# Patient Record
Sex: MALE | HISPANIC OR LATINO | Employment: UNEMPLOYED | URBAN - METROPOLITAN AREA
[De-identification: names, ages, dates, MRNs, and addresses within clinical notes are randomized per-mention and may not be internally consistent; named-entity substitution may affect disease eponyms.]

---

## 2017-05-03 ENCOUNTER — OFFICE VISIT (OUTPATIENT)
Dept: URGENT CARE | Facility: CLINIC | Age: 22
End: 2017-05-03
Payer: COMMERCIAL

## 2017-05-03 VITALS
RESPIRATION RATE: 20 BRPM | HEART RATE: 72 BPM | OXYGEN SATURATION: 96 % | DIASTOLIC BLOOD PRESSURE: 80 MMHG | TEMPERATURE: 99.9 F | WEIGHT: 179 LBS | HEIGHT: 67 IN | BODY MASS INDEX: 28.09 KG/M2 | SYSTOLIC BLOOD PRESSURE: 120 MMHG

## 2017-05-03 DIAGNOSIS — J06.9 VIRAL UPPER RESPIRATORY ILLNESS: ICD-10-CM

## 2017-05-03 LAB
INT CON NEG: NORMAL
INT CON POS: NORMAL
S PYO AG THROAT QL: NEGATIVE

## 2017-05-03 PROCEDURE — 87880 STREP A ASSAY W/OPTIC: CPT | Performed by: FAMILY MEDICINE

## 2017-05-03 PROCEDURE — 99203 OFFICE O/P NEW LOW 30 MIN: CPT | Performed by: FAMILY MEDICINE

## 2017-05-03 ASSESSMENT — ENCOUNTER SYMPTOMS
SWOLLEN GLANDS: 1
TROUBLE SWALLOWING: 1
COUGH: 1
DIARRHEA: 0
ABDOMINAL PAIN: 0
NAUSEA: 0
HOARSE VOICE: 1
CHILLS: 1
VOMITING: 0
FEVER: 1
DIZZINESS: 1
HEADACHES: 1
SHORTNESS OF BREATH: 0

## 2017-05-03 NOTE — Clinical Note
May 3, 2017         Patient: Harley Taveras   YOB: 1995   Date of Visit: 5/3/2017           To Whom it May Concern:    Harley Taveras was seen in my clinic on 5/3/2017. He may return to school on Saturday, 5/6/17.    If you have any questions or concerns, please don't hesitate to call.        Sincerely,           Andrea Moore M.D.  Electronically Signed

## 2017-05-03 NOTE — MR AVS SNAPSHOT
"        Harley Police   5/3/2017 5:00 PM   Office Visit   MRN: [id number]    Department:  [facility]   Dept Phone:  [phone]    Description:  Male : 1995   Provider:  Andrea Moore M.D.           Reason for Visit     Pharyngitis Couple days sorethroat, neck pain and headache      Allergies as of 5/3/2017     No Known Allergies      You were diagnosed with     Viral upper respiratory illness   [[id number]]         Vital Signs     Blood Pressure Pulse Temperature Respirations Height Weight    120/80 mmHg 72 37.7 °C (99.9 °F) 20 1.702 m (5' 7\") 81.194 kg (179 lb)    Body Mass Index Oxygen Saturation Smoking Status             28.03 kg/m2 96% Never Smoker          Basic Information     Date Of Birth Sex Race Ethnicity Preferred Language    1995 Male Unable to Obtain  Origin (Slovak,Norwegian,Fijian,Sarmad, etc) English      Health Maintenance     Patient has no pending health maintenance at this time      Current Immunizations     No immunizations on file.      Below and/or attached are the medications your provider expects you to take. Review all of your home medications and newly ordered medications with your provider and/or pharmacist. Follow medication instructions as directed by your provider and/or pharmacist. Please keep your medication list with you and share with your provider. Update the information when medications are discontinued, doses are changed, or new medications (including over-the-counter products) are added; and carry medication information at all times in the event of emergency situations     Allergies:  No Known Allergies          Medications  Valid as of: May 03, 2017 -  5:18 PM    Generic Name Brand Name Tablet Size Instructions for use    mag hydrox-al hydrox-simeth-diphenhydrAMINE-lidocaine viscous 2%   Take 30 mL by mouth 4 times a day as needed for up to 4 days.        .                 Medicines prescribed today were sent to:     [id number] - " [place], [place] - [address] AT [address] [facility] [address] & [place]    [address] [place] [address]    Phone: [phone] Fax: [phone]    Open 24 Hours?: Yes      Medication refill instructions:       If your prescription bottle indicates you have medication refills left, it is not necessary to call your provider’s office. Please contact your pharmacy and they will refill your medication.    If your prescription bottle indicates you do not have any refills left, you may request refills at any time through one of the following ways: The online LEAPIN Digital Keys system (except Urgent Care), by calling your provider’s office, or by asking your pharmacy to contact your provider’s office with a refill request. Medication refills are processed only during regular business hours and may not be available until the next business day. Your provider may request additional information or to have a follow-up visit with you prior to refilling your medication.   *Please Note: Medication refills are assigned a new Rx number when refilled electronically. Your pharmacy may indicate that no refills were authorized even though a new prescription for the same medication is available at the pharmacy. Please request the medicine by name with the pharmacy before contacting your provider for a refill.           LEAP[employer] Digital Keys Access Code: [email or web address]  Expires: 6/2/2017  5:18 PM    Your email address is not on file at [facility].  Email Addresses are required for you to sign up for LEAP[employer] Digital Keys, please contact [phone] to verify your personal information and to provide your email address prior to attempting to register for LEAP[employer] Digital Keys.    Harley Police  [address] [place] [address]    LEAPIN Digital Keys  A secure, online tool to manage your health information     [facility]’s LEAPIN Digital Keys® is a secure, online tool that connects you to your personalized health information from the privacy of your home -- day or night - making it very easy for you to  manage your healthcare. Once the activation process is completed, you can even access your medical information using the Amimon mallorie, which is available for free in the Apple Mallorie store or Google Play store.     To learn more about Amimon, visit [email or web address]    There are two levels of access available (as shown below):   My Chart Features  [facility]  Urgent  Care [facility]   Email your healthcare team securely and privately 24/7 X X X    Manage appointments: schedule your next appointment; view details of past/upcoming appointments X      Request prescription refills. X      View recent personal medical records, including lab and immunizations X X X X   View health record, including health history, allergies, medications X X X X   Read reports about your outpatient visits, procedures, consult and ER notes X X X X   See your discharge summary, which is a recap of your hospital and/or ER visit that includes your diagnosis, lab results, and care plan X X  X     How to register for Amimon:  Once your e-mail address has been verified, follow the following steps to sign up for Amimon.     1. Go to  [email or web address]  2. Click on the Sign Up Now box, which takes you to the New Member Sign Up page. You will need to provide the following information:  a. Enter your Amimon Access Code exactly as it appears at the top of this page. (You will not need to use this code after you’ve completed the sign-up process. If you do not sign up before the expiration date, you must request a new code.)   b. Enter your date of birth.   c. Enter your home email address.   d. Click Submit, and follow the next screen’s instructions.  3. Create a Amimon ID. This will be your Amimon login ID and cannot be changed, so think of one that is secure and easy to remember.  4. Create a Amimon password. You can change your password at any time.  5. Enter your  Password Reset Question and Answer. This can be used at a later time if you forget your password.   6. Enter your e-mail address. This allows you to receive e-mail notifications when new information is available in [employer].  7. Click Sign Up. You can now view your health information.    For assistance activating your [employer] account, call [phone]

## 2017-05-04 NOTE — PROGRESS NOTES
"Subjective:      Harley Taveras is a 21 y.o. male who presents with Pharyngitis            Pharyngitis   This is a new problem. The current episode started in the past 7 days (2-3 days). The problem has been unchanged. The pain is worse on the right side. The pain is at a severity of 5/10. The pain is moderate. Associated symptoms include congestion, coughing, ear pain, headaches, a hoarse voice, a plugged ear sensation, swollen glands and trouble swallowing. Pertinent negatives include no abdominal pain, diarrhea, ear discharge, shortness of breath or vomiting. He has had exposure to strep. He has had no exposure to mono. He has tried NSAIDs and acetaminophen (nyquil) for the symptoms. The treatment provided moderate relief.       Review of Systems   Constitutional: Positive for fever and chills.   HENT: Positive for congestion, ear pain, hoarse voice and trouble swallowing. Negative for ear discharge.    Respiratory: Positive for cough. Negative for shortness of breath.    Cardiovascular: Negative for chest pain.   Gastrointestinal: Negative for nausea, vomiting, abdominal pain and diarrhea.   Genitourinary: Negative for dysuria and urgency.   Neurological: Positive for dizziness and headaches.     PMH:  has a past medical history of Brain concussion.  MEDS: No current outpatient prescriptions on file.  ALLERGIES: No Known Allergies  SURGHX:   Past Surgical History   Procedure Laterality Date   • Dental extraction(s)       SOCHX:  reports that he has never smoked. He does not have any smokeless tobacco history on file. He reports that he drinks alcohol. He reports that he uses illicit drugs (Marijuana).  FH: Family history was reviewed, no pertinent findings to report       Objective:     /80 mmHg  Pulse 72  Temp(Src) 37.7 °C (99.9 °F)  Resp 20  Ht 1.702 m (5' 7\")  Wt 81.194 kg (179 lb)  BMI 28.03 kg/m2  SpO2 96%     Physical Exam   Constitutional: He appears well-developed.   HENT:   Head: " Normocephalic.   Right Ear: External ear normal.   Left Ear: External ear normal.   Mouth/Throat: Oropharyngeal exudate present.   Nasal congestion    Eyes: Right eye exhibits no discharge. Left eye exhibits no discharge.   Neck: Normal range of motion. No tracheal deviation present. No thyromegaly present.   Right cervical lymph node   Cardiovascular: Normal rate.  Exam reveals no friction rub.    No murmur heard.  Pulmonary/Chest: Effort normal. No stridor. No respiratory distress. He has no wheezes.   Abdominal: Soft. He exhibits no distension. There is no tenderness. There is no guarding.   Lymphadenopathy:     He has cervical adenopathy.   Neurological: He is alert.   Skin: Skin is warm and dry. He is not diaphoretic.   Psychiatric: He has a normal mood and affect. His behavior is normal.               Assessment/Plan:     1. Viral upper respiratory illness  POCT Rapid Strep A    mag hydrox-al hydrox-simeth-diphenhydrAMINE-lidocaine viscous 2%     Supportive care  Push fluids  Monitor temperature  Follow-up if symptoms worsen or fail to improve

## 2017-12-15 ENCOUNTER — OFFICE VISIT (OUTPATIENT)
Dept: MEDICAL GROUP | Facility: MEDICAL CENTER | Age: 22
End: 2017-12-15
Payer: COMMERCIAL

## 2017-12-15 VITALS
OXYGEN SATURATION: 98 % | HEIGHT: 67 IN | RESPIRATION RATE: 16 BRPM | SYSTOLIC BLOOD PRESSURE: 116 MMHG | WEIGHT: 175.8 LBS | HEART RATE: 67 BPM | DIASTOLIC BLOOD PRESSURE: 74 MMHG | BODY MASS INDEX: 27.59 KG/M2 | TEMPERATURE: 97.3 F

## 2017-12-15 DIAGNOSIS — F41.9 ANXIETY AND DEPRESSION: ICD-10-CM

## 2017-12-15 DIAGNOSIS — F32.A ANXIETY AND DEPRESSION: ICD-10-CM

## 2017-12-15 DIAGNOSIS — Z76.89 ENCOUNTER TO ESTABLISH CARE: ICD-10-CM

## 2017-12-15 PROCEDURE — 99204 OFFICE O/P NEW MOD 45 MIN: CPT | Performed by: PHYSICIAN ASSISTANT

## 2017-12-15 RX ORDER — CITALOPRAM 20 MG/1
20 TABLET ORAL DAILY
Qty: 30 TAB | Refills: 3 | Status: SHIPPED | OUTPATIENT
Start: 2017-12-15 | End: 2018-02-05 | Stop reason: SDUPTHER

## 2017-12-15 ASSESSMENT — PATIENT HEALTH QUESTIONNAIRE - PHQ9
SUM OF ALL RESPONSES TO PHQ QUESTIONS 1-9: 19
5. POOR APPETITE OR OVEREATING: 0 - NOT AT ALL
CLINICAL INTERPRETATION OF PHQ2 SCORE: 5

## 2017-12-15 NOTE — PROGRESS NOTES
"Subjective:   Harley Taveras is a 22 y.o. male here today for chronic history of anxiety and depression.    Anxiety and depression  This is a 15-year-old male complains of a chronic history of anxiety and depression. He's been feeling this way for about 7 years. He states finally he is accepting the fact that he needs help. His mother is up here from [place] his home town to help him. Complains of lack of self worth. Sleeping all the time. This past week he had his first panic attack. Thought he was going to die. Complains of anxiety where he doesn't want to leave his dwelling. His mother's been calling around and he does have an appointment on the fifth with behavioral health. This was actually unknown to us until we printed out the discharge summary. Denies any homicidal or suicidal ideations.       Current medicines (including changes today)  Current Outpatient Prescriptions   Medication Sig Dispense Refill   • citalopram (CELEXA) 20 MG Tab Take 1 Tab by mouth every day. 1/2 tablet daily x 5 days. 30 Tab 3     No current facility-administered medications for this visit.      He  has a past medical history of Brain concussion.    ROS   No chest pain, no shortness of breath, no abdominal pain and all other systems were reviewed and are negative.       Objective:     Blood pressure 116/74, pulse 67, temperature 36.3 °C (97.3 °F), resp. rate 16, height 1.702 m (5' 7\"), weight 79.7 kg (175 lb 12.8 oz), SpO2 98 %. Body mass index is 27.53 kg/m².   Physical Exam:  Constitutional: Alert, no distress.  Skin: Warm, dry, good turgor, no rashes in visible areas.  Eye: Equal, round and reactive, conjunctiva clear, lids normal.  ENMT: Lips without lesions, good dentition, oropharynx clear.  Neck: Trachea midline, no masses.   Lymph: No cervical or supraclavicular lymphadenopathy  Respiratory: Unlabored respiratory effort, lungs appear clear, no wheezes.  Cardiovascular: Regular rate and rhythm.   Psych: Alert and oriented x3, " normal affect and mood.        Assessment and Plan:   The following treatment plan was discussed    1. Anxiety and depression  Chronic condition. Discussed with appointment with behavioral health on 5 January. May contact behavioral health for an appointment sooner if possible. We'll start with Celexa 10 mg for 5 days and increase to 20 mg daily. Advised to become a member of my chart contact me with any concerns or questions. We talked about medication for panic attacks but at this point since he had his first one we will wait to see to have some more frequently.  - Patient has been identified as being depressed and appropriate orders and counseling have been given  - REFERRAL TO BEHAVIORAL HEALTH  - citalopram (CELEXA) 20 MG Tab; Take 1 Tab by mouth every day. 1/2 tablet daily x 5 days.  Dispense: 30 Tab; Refill: 3    2. Encounter to establish care    Followup: Return in about 4 weeks (around 1/12/2018), or if symptoms worsen or fail to improve.    Please note that this dictation was created using voice recognition software. I have made every reasonable attempt to correct obvious errors, but I expect that there are errors of grammar and possibly content that I did not discover before finalizing the note.

## 2017-12-15 NOTE — ASSESSMENT & PLAN NOTE
This is a 15-year-old male complains of a chronic history of anxiety and depression. He's been feeling this way for about 7 years. He states finally he is accepting the fact that he needs help. His mother is up here from [place] his home town to help him. Complains of lack of self worth. Sleeping all the time. This past week he had his first panic attack. Thought he was going to die. Complains of anxiety where he doesn't want to leave his dwelling. His mother's been calling around and he does have an appointment on the fifth with behavioral health. This was actually unknown to us until we printed out the discharge summary. Denies any homicidal or suicidal ideations.

## 2018-01-05 ENCOUNTER — OFFICE VISIT (OUTPATIENT)
Dept: BEHAVIORAL HEALTH | Facility: PHYSICIAN GROUP | Age: 23
End: 2018-01-05
Payer: COMMERCIAL

## 2018-01-05 DIAGNOSIS — F41.9 MODERATE ANXIETY: ICD-10-CM

## 2018-01-05 DIAGNOSIS — F33.0 MILD EPISODE OF RECURRENT MAJOR DEPRESSIVE DISORDER (HCC): ICD-10-CM

## 2018-01-05 PROCEDURE — 90791 PSYCH DIAGNOSTIC EVALUATION: CPT | Performed by: SOCIAL WORKER
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
## 2018-01-17 ENCOUNTER — OFFICE VISIT (OUTPATIENT)
Dept: BEHAVIORAL HEALTH | Facility: PHYSICIAN GROUP | Age: 23
End: 2018-01-17
Payer: COMMERCIAL

## 2018-01-17 DIAGNOSIS — F43.22 ADJUSTMENT DISORDER WITH ANXIETY: ICD-10-CM

## 2018-01-17 DIAGNOSIS — F34.1 DYSTHYMIC DISORDER: ICD-10-CM

## 2018-01-17 DIAGNOSIS — F41.0 PANIC DISORDER: ICD-10-CM

## 2018-01-17 PROBLEM — F32.A ANXIETY AND DEPRESSION: Status: RESOLVED | Noted: 2017-12-15 | Resolved: 2018-01-17

## 2018-01-17 PROBLEM — F41.9 ANXIETY AND DEPRESSION: Status: RESOLVED | Noted: 2017-12-15 | Resolved: 2018-01-17

## 2018-01-17 PROCEDURE — 90834 PSYTX W PT 45 MINUTES: CPT | Performed by: PSYCHOLOGIST

## 2018-01-17 NOTE — BH THERAPY
[facility]  Therapy Progress Note    Patient Name: Harley Police  Patient MRN: [id number]  Today's Date: 1/17/2018     Type of session:Individual psychotherapy  Length of session: 45 minutes  Persons in attendance:Patient    Subjective/New Info: Pt transferring from [facility] due to insurance. Pt presents with struggles in intense anxiety and panic attacks. Pt has struggled with anxiety and depression off and on, but it started getting worse when moved from [facility] to start [employer] last year. Last semester, Pt could hardly get himself to leave home or attend classes and did poorly in school. Pt started celexa 4 weeks ago with some improvement. Disc tx goals. Psychoeducation and practice on ACT concepts: letting go of unproductive thought fusion, defining & acting on values, befriending anxiety and depression, mindfulness acceptance, and cultivating psychological flexibility. Gave some relaxation strategies for Pt to use. Family hx + for alcoholism, bipolar, anger problems. Gave psychoeducation re: alcohol use and anxiety.     Objective/Observations:   Participation: Active verbal participation, Attentive and Engaged   Grooming: Casual   Cognition: Alert and Fully Oriented   Eye contact: Good   Mood: Anxious   Affect: Anxious   Thought process: Logical   Speech: Rate within normal limits   Other:     Diagnoses:   1. Panic disorder    2. Dysthymic disorder    3. Adjustment disorder with anxiety         Current risk:   SUICIDE: Not applicable   Homicide: Not applicable   Self-harm: Not applicable   Relapse: Not applicable   Other:    Safety Plan reviewed? Not Indicated   If evidence of imminent risk is present, intervention/plan:     Therapeutic Intervention(s): Conflict clarification, Distress tolerance skills, Establish rapport and Goal-setting    Treatment Goal(s)/Objective(s) addressed: Tx plan:  - Learn to successfully challenge & change distortions in thinking  - Learn to ameliorate effects of  anxiety on life and functioning  - Increase behaviors of self-compassion and self-care  - Attend classes and improve grades  -        Progress toward Treatment Goals: No change    Plan:  - Continue Individual therapy and Medication management    Joan Nevarez, Ph.D.  1/17/2018

## 2018-02-02 ENCOUNTER — OFFICE VISIT (OUTPATIENT)
Dept: BEHAVIORAL HEALTH | Facility: PHYSICIAN GROUP | Age: 23
End: 2018-02-02
Payer: COMMERCIAL

## 2018-02-02 DIAGNOSIS — F41.0 PANIC DISORDER: ICD-10-CM

## 2018-02-02 DIAGNOSIS — F34.1 DYSTHYMIC DISORDER: ICD-10-CM

## 2018-02-02 DIAGNOSIS — F43.22 ADJUSTMENT DISORDER WITH ANXIETY: ICD-10-CM

## 2018-02-02 PROCEDURE — 90834 PSYTX W PT 45 MINUTES: CPT | Performed by: PSYCHOLOGIST
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
## 2018-02-05 ENCOUNTER — OFFICE VISIT (OUTPATIENT)
Dept: BEHAVIORAL HEALTH | Facility: PHYSICIAN GROUP | Age: 23
End: 2018-02-05
Payer: COMMERCIAL

## 2018-02-05 DIAGNOSIS — F41.1 GAD (GENERALIZED ANXIETY DISORDER): ICD-10-CM

## 2018-02-05 DIAGNOSIS — F41.9 ANXIETY AND DEPRESSION: ICD-10-CM

## 2018-02-05 DIAGNOSIS — F33.9 RECURRENT MAJOR DEPRESSIVE DISORDER, REMISSION STATUS UNSPECIFIED (HCC): ICD-10-CM

## 2018-02-05 DIAGNOSIS — F32.A ANXIETY AND DEPRESSION: ICD-10-CM

## 2018-02-05 PROCEDURE — 99204 OFFICE O/P NEW MOD 45 MIN: CPT | Performed by: STUDENT IN AN ORGANIZED HEALTH CARE EDUCATION/TRAINING PROGRAM

## 2018-02-05 RX ORDER — CITALOPRAM 20 MG/1
40 TABLET ORAL DAILY
Qty: 60 TAB | Refills: 2 | Status: SHIPPED | OUTPATIENT
Start: 2018-02-05 | End: 2019-05-29

## 2018-02-05 NOTE — PROGRESS NOTES
PSYCHIATRIC Evaluation:        Supervising Physician: Dr. Lindsay Lipscomb      ID: 21 y/o male with hx of depression and anxiety, seen for new establishment visit with this provider.     Chief Complaint: none currently - new establishment visit with this provider    CURRENT PSYCHOTROPIC MEDS:  celexa 20mg daily    HPI:   States that over the last 1.5 years patient has had increased anxiety that he cannot attribute to any known stressors. Says that he used to be able to go to parties and hang out in larger crowds but more recently he has significant anxiety along with increased paranoia with medium/large size groups. Says that he was isolating more and sleeping upwards of 12 hours daily with loss of energy and poor concentration. Denies feeling suicidal/homicidal, denies having low mood periods. Says that he become a little more irritable which he noticed while driving. Believes that all his symptoms are anxiety based.     Recently started on Celexa by his primary care. Says that his anxiety has improved significantly, currently at 6/10 (10 being he is at baseline 'normal'). Says that he is sleeping at 'regular' hours and not more then 8 hours nightly. Admits that at times he does sleep for 3-4 hours and has increased energy for a couple hours but this does not last for days, but otherwise is doing well. Has not noticed any changes mood lability or irritability/anger. Willing to trial 40mg celexa today- discussed risk for 'manic-like' symptoms which I explained in detail and told him to immediately go back down on his medication and notify this office. Otherwise will change his medication at this time and f/u in 4 weeks.       Psychiatric Review of Systems:current symptoms as reported by pt.  Depression:      As stated above  Abby:denies  Anxiety/Panic Attacks - social anxiety, along with increase in paranoia and irritability  PTSD symptom: denies  Psychosis:denies       Medical Review of Systems: as reported by pt.  All systems reviewed. Only those found to be + are noted below. All others are negative.   Neurological:    TBIs: concussion x1   SZs: denies   Strokes: denies    Other medical symptoms:   Thyroid: denies   Diabetes: denies   Cardiovascular disease: denies    Psychiatric Examination: observed phenomenon:    Musculoskeletal(abnormal movements, gait, etc): normal gait  Appearance: young male, looks stated age  Thoughts: linear, organized  Speech:RRR  Mood:          good  Affect:         Mood congruent  SI/HI:   Denies/denies  Attention/Alertness:   alert  Memory:    Grossly intact  Orientation:    To person, place, time, and situation intact  Fund of Knowledge:    Grossly intact  Insight/Judgement into symptoms: fair/fair        Past Psychiatric Hx:   Denies previous hx of suicidal attempt  Denies previous hx of inpatient psychiatric hospitalizations  Denies previous hx of self-injurious behaviors    Has never seen psychiatry before, currently seeing therapist on weekly basis with [facility] services.    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 Living with a roommate, has moved to [place] from [place] [place] to attend [employer] for the last 2 years. Has a new dog that he got about 2 week ago which has also been good for him. Currently in a relationship, she is living in [place], [place]. Parents and 1 older  Brother living in [place] as well.         Drug/Alcohol/Tobacco Hx:   Drugs: denies   Alcohol: 'once in a while' 1x/2 weeks   Tobacco:denies    Medical Hx: labs, MARS, medications, etc were reviewed. Only those findings of potential interest to psychiatry are noted below:  Medical Conditions:      Past Medical History:   Diagnosis Date   • Anxiety    • Brain concussion    • Depression      Allergies: No  Known Allergies  Medications (currently prescribed at [facility]):   Current Outpatient Prescriptions on File Prior to Visit   Medication Sig Dispense Refill   • citalopram (CELEXA) 20 MG Tab Take 1 Tab by mouth every day. 1/2 tablet daily x 5 days. 30 Tab 3     No current facility-administered medications on file prior to visit.      Labs: none on file  ECG: none on file    Cranial Imaging: none on file      ASSESSMENT/PLAN:    14 y/o male with hx of anxiety and depression symptoms, currently taking celexa 20mg daily for last 6 weeks. Says that his anxiety has significantly improved with his medication and he is less isolative now. Says his mood is good but that there is still room for improvement with his anxiety and willing to go up on celexa today. F/u 4 weeks      #MDD, moderate  #EDITH- with hx of increase paranoia and irritability    -titrate celexa from 20mg to 40mg daily  -f/u 4 weeks.

## 2018-02-16 ENCOUNTER — OFFICE VISIT (OUTPATIENT)
Dept: BEHAVIORAL HEALTH | Facility: PHYSICIAN GROUP | Age: 23
End: 2018-02-16
Payer: COMMERCIAL

## 2018-02-16 DIAGNOSIS — F41.1 GAD (GENERALIZED ANXIETY DISORDER): ICD-10-CM

## 2018-02-16 DIAGNOSIS — Z76.89 SLEEP CONCERN: ICD-10-CM

## 2018-02-16 PROCEDURE — 90834 PSYTX W PT 45 MINUTES: CPT | Performed by: PSYCHOLOGIST

## 2018-02-16 NOTE — BH THERAPY
[facility]  Therapy Progress Note    Patient Name: Harley Taveras  Patient MRN: [id number]  Today's Date: 2/16/2018     Type of session:Individual psychotherapy  Length of session: 45 minutes  Persons in attendance:Patient    Subjective/New Info: Pt reports having sleep difficulties since increasing celexa - notified prescriber. Disc sleep hygeign and others means of relaxing self. Denies alcohol and drug use. Denies any big stressors or worries. Still struggling with anxiety, but not as much.     Objective/Observations:   Participation: Active verbal participation, Attentive and Engaged   Grooming: Casual   Cognition: Alert and Fully Oriented   Eye contact: Good   Mood: Anxious   Affect: Constricted and Anxious   Thought process: Logical   Speech: Rate within normal limits   Other:     Diagnoses:   1. EDITH (generalized anxiety disorder)    2. Sleep concern         Current risk:   SUICIDE: Not applicable   Homicide: Not applicable   Self-harm: Not applicable   Relapse: Not applicable   Other:    Safety Plan reviewed? Not Indicated   If evidence of imminent risk is present, intervention/plan:     Therapeutic Intervention(s): Conflict clarification, Relaxation exercise, Stressors assessed and Supportive psychotherapy    Treatment Goal(s)/Objective(s) addressed: Tx plan:  - Learn to successfully challenge & change distortions in thinking  - Learn to ameliorate effects of anxiety on life and functioning  - Increase behaviors of self-compassion and self-care  - Attend classes and improve grades  -        Progress toward Treatment Goals: Mild improvement    Plan:  - Continue Individual therapy and Medication management    Joan Nevarez, Ph.D.  2/16/2018

## 2018-03-09 ENCOUNTER — OFFICE VISIT (OUTPATIENT)
Dept: BEHAVIORAL HEALTH | Facility: PHYSICIAN GROUP | Age: 23
End: 2018-03-09
Payer: COMMERCIAL

## 2018-03-09 DIAGNOSIS — F41.1 GAD (GENERALIZED ANXIETY DISORDER): ICD-10-CM

## 2018-03-09 PROCEDURE — 90834 PSYTX W PT 45 MINUTES: CPT | Performed by: PSYCHOLOGIST

## 2018-03-09 NOTE — BH THERAPY
[facility]  Therapy Progress Note    Patient Name: Harley Taveras  Patient MRN: [id number]  Today's Date: 3/9/2018     Type of session:Individual psychotherapy  Length of session: 45 minutes  Persons in attendance:Patient    Subjective/New Info: Pt reports he cut back on dose and felt better, but then he allowed his med to lapse and didn't re-fill. Pt reported he became more paranoid and irritable. Pt became irritable in session - contradicting himself about the meds helping but not helping and fearing that he will need to take them the rest of his life. Pt had difficulty gaining insight into what it means to him to have to take meds in the first place. 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 Examined the cognitive distortions in these situations, looked at the legitimate rights flyer to bring perspective into his conflict with his friends and examined how Pt is rejecting medication but is self medicating anyway.     Objective/Observations:   Participation: Active verbal participation, Attentive and Engaged   Grooming: Casual   Cognition: Alert and Fully Oriented   Eye contact: Good   Mood: Anxious and Irritable   Affect: Constricted, Anxious and Angry   Thought process: Logical   Speech: Rate within normal limits   Other:     Diagnoses:   1. EDITH (generalized anxiety disorder)         Current risk:   SUICIDE: Not applicable   Homicide: Not applicable   Self-harm: Not  applicable   Relapse: Not applicable   Other:    Safety Plan reviewed? Not Indicated   If evidence of imminent risk is present, intervention/plan:     Therapeutic Intervention(s): Behavior:  Behavioral activation, Cognitive modification, Conflict clarification, Distress tolerance skills, Stressors assessed and Supportive psychotherapy    Treatment Goal(s)/Objective(s) addressed: Tx plan:  - Learn to successfully challenge & change distortions in thinking  - Learn to ameliorate effects of anxiety on life and functioning  - Increase behaviors of self-compassion and self-care  - Attend classes and improve grades  -        Progress toward Treatment Goals: Mild decline    Plan:  - Continue Individual therapy and Medication management    Joan Nevarez, Ph.D.  3/9/2018

## 2018-05-19 ENCOUNTER — NON-PROVIDER VISIT (OUTPATIENT)
Dept: URGENT CARE | Facility: CLINIC | Age: 23
End: 2018-05-19

## 2018-05-19 DIAGNOSIS — Z02.1 PRE-EMPLOYMENT DRUG SCREENING: ICD-10-CM

## 2018-05-19 LAB
AMP AMPHETAMINE: NORMAL
COC COCAINE: NORMAL
INT CON NEG: NORMAL
INT CON POS: NORMAL
MET METHAMPHETAMINES: NORMAL
OPI OPIATES: NORMAL
PCP PHENCYCLIDINE: NORMAL
POC DRUG COMMENT 753798-OCCUPATIONAL HEALTH: NORMAL
THC: NORMAL

## 2018-05-19 PROCEDURE — 80305 DRUG TEST PRSMV DIR OPT OBS: CPT | Performed by: FAMILY MEDICINE

## 2018-06-12 ENCOUNTER — APPOINTMENT (OUTPATIENT)
Dept: BEHAVIORAL HEALTH | Facility: PHYSICIAN GROUP | Age: 23
End: 2018-06-12
Payer: COMMERCIAL

## 2019-05-29 ENCOUNTER — OCCUPATIONAL MEDICINE (OUTPATIENT)
Dept: URGENT CARE | Facility: PHYSICIAN GROUP | Age: 24
End: 2019-05-29

## 2019-05-29 VITALS
WEIGHT: 212 LBS | HEART RATE: 64 BPM | HEIGHT: 67 IN | BODY MASS INDEX: 33.27 KG/M2 | DIASTOLIC BLOOD PRESSURE: 80 MMHG | OXYGEN SATURATION: 98 % | SYSTOLIC BLOOD PRESSURE: 122 MMHG | RESPIRATION RATE: 16 BRPM | TEMPERATURE: 98.5 F

## 2019-05-29 DIAGNOSIS — Z02.1 PRE-EMPLOYMENT EXAMINATION: ICD-10-CM

## 2019-05-29 DIAGNOSIS — Z02.1 PRE-EMPLOYMENT DRUG SCREENING: ICD-10-CM

## 2019-05-29 LAB
BREATH ALCOHOL COMMENT: NORMAL
POC BREATHALIZER: 0 PERCENT (ref 0–0.01)

## 2019-05-29 PROCEDURE — 8915 PR COMPREHENSIVE PHYSICAL: Performed by: FAMILY MEDICINE

## 2019-05-29 PROCEDURE — 92553 AUDIOMETRY AIR & BONE: CPT | Performed by: FAMILY MEDICINE

## 2019-05-29 PROCEDURE — 82075 ASSAY OF BREATH ETHANOL: CPT | Performed by: FAMILY MEDICINE

## 2019-05-29 PROCEDURE — 94010 BREATHING CAPACITY TEST: CPT | Performed by: FAMILY MEDICINE

## 2019-05-29 PROCEDURE — 8907 PR URINE COLLECT ONLY: Performed by: FAMILY MEDICINE

## 2019-05-29 NOTE — PROGRESS NOTES
Chief Complaint:    Chief Complaint   Patient presents with   • Employment Physical     [facility]       History of Present Illness:    Here for pre-employment exam.      Review of Systems:    Constitutional: Negative for fever, chills, and diaphoresis.   Eyes: Negative for change in vision, photophobia, pain, redness, and discharge.  ENT: Negative for ear pain, ear discharge, hearing loss, tinnitus, nasal congestion, nosebleeds, and sore throat.    Respiratory: Negative for cough, hemoptysis, sputum production, shortness of breath, wheezing, and stridor.    Cardiovascular: Negative for chest pain, palpitations, orthopnea, claudication, leg swelling, and PND.   Gastrointestinal: Negative for abdominal pain, nausea, vomiting, diarrhea, constipation, blood in stool, and melena.   Genitourinary: Negative for dysuria, urinary urgency, urinary frequency, hematuria, and flank pain.   Musculoskeletal: Negative for myalgias, joint pain, neck pain, and back pain.   Skin: Negative for rash and itching.   Neurological: Negative for dizziness, tingling, tremors, sensory change, speech change, focal weakness, seizures, loss of consciousness, and headaches.   Endo: Negative for polydipsia.   Heme: Does not bruise/bleed easily.   Psychiatric/Behavioral: No new symptoms.      Past Medical History:    Past Medical History:   Diagnosis Date   • Anxiety    • Brain concussion    • Depression      Past Surgical History:    Past Surgical History:   Procedure Laterality Date   • DENTAL EXTRACTION(S)       Social History:    Social History     Social History   • Marital status: Single     Spouse name: N/A   • Number of children: N/A   • Years of education: N/A     Occupational History   • Not on file.     Social History Main Topics   • Smoking status: Never Smoker   • Smokeless tobacco: Never Used   • Alcohol use Yes      Comment: rarely   • Drug use: No   • Sexual activity: Yes     Partners: Female     Other Topics Concern   • Not on file  "    Social History Narrative   • No narrative on file     Family History:    Family History   Problem Relation Age of Onset   • Alcohol abuse Mother    • Bipolar disorder Mother    • Alcohol abuse Father    • Depression Father    • Depression Brother          anger problem   • Alcohol abuse Maternal Grandmother    • Alcohol abuse Paternal Grandfather      Medications:    No current outpatient prescriptions on file prior to visit.     No current facility-administered medications on file prior to visit.      Allergies:    No Known Allergies      Vitals:    Vitals:    05/29/19 1113   BP: 122/80   Pulse: 64   Resp: 16   Temp: 36.9 °C (98.5 °F)   TempSrc: Temporal   SpO2: 98%   Weight: 96.2 kg (212 lb)   Height: 1.689 m (5' 6.5\")     Vision: 20/25 right, 20/25 left, 20/20 both with contacts.      Physical Exam:    Constitutional: Vital signs reviewed. Appears well-developed and well-nourished. No acute distress.   Eyes: Sclera white, conjunctivae clear. PERRLA.  ENT: External ears normal. External auditory canals normal without discharge. TMs translucent and non-bulging. Hearing normal. Nasal mucosa pink. Lips/teeth are normal. Oral mucosa pink and moist. Posterior pharynx: WNL.  Neck: Neck supple.   Cardiovascular: Regular rate and rhythm. No murmur. No edema. No varicosities. Peripheral pulses 2+.  Pulmonary/Chest: Respirations non-labored. Clear to auscultation bilaterally.  Abdomen: Bowel sounds are normal active. Soft, non-distended, and non-tender to palpation. No hepatosplenomegaly. No hernia.   Lymph: Cervical nodes without tenderness or enlargement.  Musculoskeletal: Normal gait. Normal range of motion. No tenderness to palpation. No muscular atrophy or weakness.  Neurological: Alert and oriented to person, place, and time. CN 2-12 intact. Muscle tone normal. Coordination normal. Light touch and sensation normal. Reflexes 2+.  Skin: No rashes or lesions. Warm, dry, normal turgor.  Psychiatric: Normal mood and " affect. Behavior is normal. Judgment and thought content normal.       Diagnostics:    Spirometry: Normal.    Audiogram: Normal.    POCT BREATH ALCOHOL TEST (Order #[id number]) on 5/29/19   Component Results     Component Value Ref Range & Units Status   POC Breathalizer 0.00  0.00 - 0.01 Percent Final   Breath Alcohol Comment      Last Resulted Time   Wed May 29, 2019 11:45 AM       Assessment / Plan:    1. Pre-employment examination    2. Pre-employment drug screening  - POCT Breath Alcohol Test      Patient appears to be in a state of good health and is physically able to perform essential functions of the job title above without accommodations.    Employee is able to perform the essential functions of job as it pertains to the audiogram without accomodations.    Employee is physically able to perform essential functions of job defined at time of evaluation without accommodations as it pertains to their respiratory status.    Patient appears to be free from any other contagious disease.    Form completed.